# Patient Record
Sex: MALE | Race: OTHER | HISPANIC OR LATINO | URBAN - METROPOLITAN AREA
[De-identification: names, ages, dates, MRNs, and addresses within clinical notes are randomized per-mention and may not be internally consistent; named-entity substitution may affect disease eponyms.]

---

## 2021-12-14 ENCOUNTER — EMERGENCY (EMERGENCY)
Facility: HOSPITAL | Age: 41
LOS: 1 days | Discharge: HOME | End: 2021-12-14
Attending: STUDENT IN AN ORGANIZED HEALTH CARE EDUCATION/TRAINING PROGRAM
Payer: MEDICARE

## 2021-12-14 VITALS
DIASTOLIC BLOOD PRESSURE: 90 MMHG | OXYGEN SATURATION: 96 % | RESPIRATION RATE: 18 BRPM | HEART RATE: 110 BPM | SYSTOLIC BLOOD PRESSURE: 157 MMHG | TEMPERATURE: 98 F

## 2021-12-14 LAB
ALBUMIN SERPL ELPH-MCNC: 4.7 G/DL — SIGNIFICANT CHANGE UP (ref 3.3–5.2)
ALP SERPL-CCNC: 135 U/L — HIGH (ref 40–120)
ALT FLD-CCNC: 103 U/L — HIGH
ANION GAP SERPL CALC-SCNC: 19 MMOL/L — HIGH (ref 5–17)
ANISOCYTOSIS BLD QL: SLIGHT — SIGNIFICANT CHANGE UP
APTT BLD: 30.2 SEC — SIGNIFICANT CHANGE UP (ref 27.5–35.5)
AST SERPL-CCNC: 156 U/L — HIGH
BASOPHILS # BLD AUTO: 0 K/UL — SIGNIFICANT CHANGE UP (ref 0–0.2)
BASOPHILS NFR BLD AUTO: 0 % — SIGNIFICANT CHANGE UP (ref 0–2)
BILIRUB SERPL-MCNC: 0.5 MG/DL — SIGNIFICANT CHANGE UP (ref 0.4–2)
BUN SERPL-MCNC: 11 MG/DL — SIGNIFICANT CHANGE UP (ref 8–20)
CALCIUM SERPL-MCNC: 8.9 MG/DL — SIGNIFICANT CHANGE UP (ref 8.6–10.2)
CHLORIDE SERPL-SCNC: 102 MMOL/L — SIGNIFICANT CHANGE UP (ref 98–107)
CO2 SERPL-SCNC: 20 MMOL/L — LOW (ref 22–29)
CREAT SERPL-MCNC: 0.63 MG/DL — SIGNIFICANT CHANGE UP (ref 0.5–1.3)
EOSINOPHIL # BLD AUTO: 0.79 K/UL — HIGH (ref 0–0.5)
EOSINOPHIL NFR BLD AUTO: 4.8 % — SIGNIFICANT CHANGE UP (ref 0–6)
ETHANOL SERPL-MCNC: 357 MG/DL — HIGH (ref 0–9)
GLUCOSE SERPL-MCNC: 198 MG/DL — HIGH (ref 70–99)
HCT VFR BLD CALC: 48.9 % — SIGNIFICANT CHANGE UP (ref 39–50)
HGB BLD-MCNC: 16.3 G/DL — SIGNIFICANT CHANGE UP (ref 13–17)
INR BLD: 1.02 RATIO — SIGNIFICANT CHANGE UP (ref 0.88–1.16)
LYMPHOCYTES # BLD AUTO: 30.5 % — SIGNIFICANT CHANGE UP (ref 13–44)
LYMPHOCYTES # BLD AUTO: 5.05 K/UL — HIGH (ref 1–3.3)
MACROCYTES BLD QL: SLIGHT — SIGNIFICANT CHANGE UP
MANUAL SMEAR VERIFICATION: SIGNIFICANT CHANGE UP
MCHC RBC-ENTMCNC: 28.5 PG — SIGNIFICANT CHANGE UP (ref 27–34)
MCHC RBC-ENTMCNC: 33.3 GM/DL — SIGNIFICANT CHANGE UP (ref 32–36)
MCV RBC AUTO: 85.5 FL — SIGNIFICANT CHANGE UP (ref 80–100)
MONOCYTES # BLD AUTO: 0.63 K/UL — SIGNIFICANT CHANGE UP (ref 0–0.9)
MONOCYTES NFR BLD AUTO: 3.8 % — SIGNIFICANT CHANGE UP (ref 2–14)
MYELOCYTES NFR BLD: 0.9 % — HIGH (ref 0–0)
NEUTROPHILS # BLD AUTO: 8.51 K/UL — HIGH (ref 1.8–7.4)
NEUTROPHILS NFR BLD AUTO: 51.4 % — SIGNIFICANT CHANGE UP (ref 43–77)
OVALOCYTES BLD QL SMEAR: SLIGHT — SIGNIFICANT CHANGE UP
PLAT MORPH BLD: NORMAL — SIGNIFICANT CHANGE UP
PLATELET # BLD AUTO: 206 K/UL — SIGNIFICANT CHANGE UP (ref 150–400)
POIKILOCYTOSIS BLD QL AUTO: SLIGHT — SIGNIFICANT CHANGE UP
POLYCHROMASIA BLD QL SMEAR: SLIGHT — SIGNIFICANT CHANGE UP
POTASSIUM SERPL-MCNC: 3.8 MMOL/L — SIGNIFICANT CHANGE UP (ref 3.5–5.3)
POTASSIUM SERPL-SCNC: 3.8 MMOL/L — SIGNIFICANT CHANGE UP (ref 3.5–5.3)
PROT SERPL-MCNC: 8.8 G/DL — HIGH (ref 6.6–8.7)
PROTHROM AB SERPL-ACNC: 11.8 SEC — SIGNIFICANT CHANGE UP (ref 10.6–13.6)
RAPID RVP RESULT: DETECTED
RBC # BLD: 5.72 M/UL — SIGNIFICANT CHANGE UP (ref 4.2–5.8)
RBC # FLD: 15.6 % — HIGH (ref 10.3–14.5)
RBC BLD AUTO: ABNORMAL
RV+EV RNA SPEC QL NAA+PROBE: DETECTED
SARS-COV-2 RNA SPEC QL NAA+PROBE: SIGNIFICANT CHANGE UP
SMUDGE CELLS # BLD: PRESENT — SIGNIFICANT CHANGE UP
SODIUM SERPL-SCNC: 141 MMOL/L — SIGNIFICANT CHANGE UP (ref 135–145)
TROPONIN T SERPL-MCNC: <0.01 NG/ML — SIGNIFICANT CHANGE UP (ref 0–0.06)
VARIANT LYMPHS # BLD: 8.6 % — HIGH (ref 0–6)
WBC # BLD: 16.55 K/UL — HIGH (ref 3.8–10.5)
WBC # FLD AUTO: 16.55 K/UL — HIGH (ref 3.8–10.5)

## 2021-12-14 PROCEDURE — 99285 EMERGENCY DEPT VISIT HI MDM: CPT

## 2021-12-14 PROCEDURE — 93010 ELECTROCARDIOGRAM REPORT: CPT

## 2021-12-14 PROCEDURE — 70450 CT HEAD/BRAIN W/O DYE: CPT | Mod: 26,MA

## 2021-12-14 RX ORDER — MIDAZOLAM HYDROCHLORIDE 1 MG/ML
2 INJECTION, SOLUTION INTRAMUSCULAR; INTRAVENOUS ONCE
Refills: 0 | Status: DISCONTINUED | OUTPATIENT
Start: 2021-12-14 | End: 2021-12-14

## 2021-12-14 RX ADMIN — MIDAZOLAM HYDROCHLORIDE 2 MILLIGRAM(S): 1 INJECTION, SOLUTION INTRAMUSCULAR; INTRAVENOUS at 14:39

## 2021-12-14 NOTE — ED PROVIDER NOTE - NSFOLLOWUPINSTRUCTIONS_ED_ALL_ED_FT
Intoxicación de alcohol    LO QUE NECESITA SABER:    ¿Qué es la intoxicación con alcohol?La intoxicación por alcohol es annemarie condición física dañina causada cuando usted yana más alcohol de lo que ramírez cuerpo puede manejar. También se llama envenenamiento por etanol o estado ebrio.    ¿Qué necesito saber acerca de los límites recomendados de alcohol?  •Los hombres de 21 a 64 añosdeberían limitar el consumo de alcohol a 2 tragos al día. No tome más de 4 bebidas por día o más de 14 en annemarie semana.      •Todos los hombres y las mujeres de 65 años o másdeberían limitar el consumo de alcohol a 1 trago por día. No tome más de 3 bebidas por día o más de 7 en annemarie semana. Ninguna cantidad de alcohol se considera adecuada soy el embarazo.      ¿Qué son los signos y síntomas comunes de la intoxicación con alcohol?  •Aliento que huele analy alcohol      •Pérdida de la conciencia o convulsiones      •Pupilas dilatadas o movimientos oculares más rápidos de lo que normalmente son para usted      •Latidos cardíacos rápidos y respiración lenta      •Pérdida del equilibrio o incapacidad de caminar derecho o permanecer parado      •Náuseas y vómitos      •Habla incoherente o fuertemente      •Cambios de humor rápidos      •Problemas en el trabajo o en la escuela, o comportamiento riesgoso, aanly tener relaciones sexuales sin protección o conducir en estado de embriaguez      ¿Cómo se trata la intoxicación de alcohol?Ramírez médico le preguntará por ramírez consumo de alcohol. Las preguntas pueden incluir la cantidad, la frecuencia y el tipo de alcohol que usted yana. Ramírez médico puede evaluar ramírez memoria. Se pueden examinar muestras de andrea u orina para detectar alcohol y signos de daño hepático, renal o cardíaco. Es posible que deba seguir alguno de los siguientes tratamientos:   •Los medicamentospueden administrarse para ayudarlo a mantener la calma, controlar las convulsiones o prevenir las náuseas y los vómitos. También se le pueden administrar glucosa o vitamina B1 si carol ann niveles son demasiado bajos.      •En la terapia de intervención breve,un profesional de la edda lo ayuda a pensar de manera diferente sobre ramírez consumo de alcohol. Khushboo profesional también lo ayuda a fijar metas para disminuir ramírez consumo de bebidas alcohólicas. La terapia puede continuar después de que sale del hospital.      •El oxígeno adicionalsi está tan intoxicado que no puede respirar natalie por sí mismo.      ¿Dónde puedo obtener más información?  •Alcoholics Anonymous  Web Address: http://www.aa.org      •Substance Abuse and Mental Health Services Administration  [address]  [place] [address]  Web Address: http://www.samhsa.gov        Llame al número de emergencias local (911 en los [place]) si:  •Tiene dolor en el pecho o dificultad repentina para respirar.      •Usted sufre annemarie convulsión.      •Usted se siente suficientemente gabby analy para lastimarse o lastimar a otras personas.      ¿Cuándo christal llamar a mi médico?  •Usted tiene alucinaciones (ve o escucha cosas que no son reales).      •Usted no puede dejar de vomitar.      •Usted tiene preguntas o inquietudes acerca de ramírez condición o cuidado.      ACUERDOS SOBRE RAMÍREZ CUIDADO:    Usted tiene el derecho de ayudar a planear ramírez cuidado. Aprenda todo lo que pueda sobre ramírez condición y analy darle tratamiento. Discuta carol ann opciones de tratamiento con carol ann médicos para decidir el cuidado que usted desea recibir. Usted siempre tiene el derecho de rechazar el tratamiento.

## 2021-12-14 NOTE — ED PROVIDER NOTE - OBJECTIVE STATEMENT
38 y/o M pt with unknown pmhx presenting today with c/o AMS. Per ems, pt was found by the [employer]odega, surrounded by bottles of alcohol. Pt denies drinking today, noted to have L sided facial droop. Hx limited. Code stroke called, lkw unknown.

## 2021-12-14 NOTE — ED ADULT TRIAGE NOTE - CHIEF COMPLAINT QUOTE
Patient BIBA to ED today after being found sitting up on a crate outside of a deli with empty beer bottles and empty bottle Randell Vodka at his side.  Dr. Garcia at bedside to evaluate patient. Patient BIBA to ED today after being found sitting up on a crate outside of a deli with empty beer bottles and empty bottle Randell Vodka at his side.  Dr. Garcia at bedside to evaluate patient.  GCS 13. Patient BIBA to ED today after being found sitting up on a crate outside of a deli with empty beer bottles and empty bottle Randell Vodka at his side.  Dr. Garcia at bedside to evaluate patient.  GCS 13.  Code stroke called by MD Wharton.

## 2021-12-14 NOTE — ED ADULT NURSE REASSESSMENT NOTE - GENERAL PATIENT STATE
Received patient sleeping on stretcher, responses to tactile stimuli, breathing freely via room air with no sign of acute distress noted. AOB. Patient is in stable condition. Safety measures in progress. Pending sobriety. Will continue to monitor./resting/sleeping

## 2021-12-14 NOTE — CONSULT NOTE ADULT - SUBJECTIVE AND OBJECTIVE BOX
last seen well=   last seen well= Not known. He was found outside a liquor store.   last seen well= Not known. He was found outside a liquor store.  Code stroke was cancelled   per EDMD as suspicion for a stroke is low and patient was restless and would need to be sedated for CTA CTP.

## 2021-12-14 NOTE — ED PROVIDER NOTE - PHYSICAL EXAMINATION
Gen: no acute distress, appears grossly intoxicated, unkempt appearing  Head: normocephalic, atraumatic  Lung: CTAB, no respiratory distress, no wheezing, rales, rhonchi  CV: normal s1/s2, rrr,   Abd: soft, no tenderness, non-distended  MSK: No edema, no visible deformities, full range of motion in all 4 extremities  Neuro: 5/5 strength to all extremities, sensation intact, L sided facial droop, otherwise CN 2-12 intact   Skin: No rash   Psych: normal affect

## 2021-12-14 NOTE — ED PROVIDER NOTE - CLINICAL SUMMARY MEDICAL DECISION MAKING FREE TEXT BOX
pt presenting as code stroke with unknown lkw. Found by ems surrounded by empty bottles, but denies drinking today. Noted L facial droop. WIll order CTs, check labs, etoh, reassess.

## 2021-12-14 NOTE — ED PROVIDER NOTE - PATIENT PORTAL LINK FT
You can access the FollowMyHealth Patient Portal offered by [facility] by registering at the following website: [email or web address]. By joining [employer]’s FollowMyHealth portal, you will also be able to view your health information using other applications (apps) compatible with our system.

## 2021-12-14 NOTE — ED PROVIDER NOTE - PROGRESS NOTE DETAILS
As per sign-out from Dr. Loza patient pending sobriety. Patient was a code stroke however, slight facial droop has resolved and patient with resting comfortably. Patient is easily arousable and improved mental state.

## 2021-12-14 NOTE — ED PROVIDER NOTE - ATTENDING CONTRIBUTION TO CARE
Akiko: I performed a face to face evaluation of this patient and performed a full history and physical examination on the patient.  I agree with the resident's history, physical examination, and plan of the patient unless otherwise noted. My brief assessment is as follows: biba limited history, found outside [place], bystander called, pt altered with right facial droop. pt denies etoh, not speaking much, unclear onset of facial droop/ams. limited effort of lifting arms but can lift off bed when following commands. no other obvious deficits. suspect likely etoh abuse given story but without pmh/history from patient and with ams/some mild droop/foaming from mouth stroke activated. not tpa candidate given no known time of onset/baseline. check etoh, reassess.

## 2021-12-15 VITALS
DIASTOLIC BLOOD PRESSURE: 87 MMHG | OXYGEN SATURATION: 98 % | SYSTOLIC BLOOD PRESSURE: 150 MMHG | RESPIRATION RATE: 17 BRPM | HEART RATE: 99 BPM

## 2021-12-15 NOTE — ED ADULT NURSE NOTE - OBJECTIVE STATEMENT
Patient is alert and oriented x 3, breathing freely via room with no sign of acute distress noted. Patient is due for discharge. Patient is in stable condition, ambulates with steady gait.

## 2021-12-15 NOTE — ED ADULT NURSE NOTE - CHIEF COMPLAINT QUOTE
Patient BIBA to ED today after being found sitting up on a crate outside of a deli with empty beer bottles and empty bottle Randell Vodka at his side.  Dr. Garcia at bedside to evaluate patient.  GCS 13.  Code stroke called by MD Wharton.

## 2021-12-15 NOTE — ED ADULT NURSE NOTE - CAS EDN DISCHARGE ASSESSMENT
Patient left the unit in stable condition, to go to the waiting area to wait for ride in the morning. ambulates with steady gait./Alert and oriented to person, place and time/Awake/Symptoms improved 2

## 2022-06-05 ENCOUNTER — EMERGENCY (EMERGENCY)
Facility: HOSPITAL | Age: 42
LOS: 1 days | Discharge: HOME | End: 2022-06-05
Attending: EMERGENCY MEDICINE
Payer: SELF-PAY

## 2022-06-05 VITALS
WEIGHT: 199.96 LBS | OXYGEN SATURATION: 95 % | SYSTOLIC BLOOD PRESSURE: 129 MMHG | RESPIRATION RATE: 20 BRPM | HEIGHT: 67 IN | HEART RATE: 114 BPM | DIASTOLIC BLOOD PRESSURE: 77 MMHG | TEMPERATURE: 98 F

## 2022-06-05 PROCEDURE — 71045 X-RAY EXAM CHEST 1 VIEW: CPT | Mod: 26

## 2022-06-05 PROCEDURE — 90715 TDAP VACCINE 7 YRS/> IM: CPT

## 2022-06-05 PROCEDURE — 93010 ELECTROCARDIOGRAM REPORT: CPT

## 2022-06-05 PROCEDURE — 72125 CT NECK SPINE W/O DYE: CPT | Mod: 26,MA

## 2022-06-05 PROCEDURE — 70486 CT MAXILLOFACIAL W/O DYE: CPT | Mod: MA

## 2022-06-05 PROCEDURE — 93005 ELECTROCARDIOGRAM TRACING: CPT

## 2022-06-05 PROCEDURE — 70450 CT HEAD/BRAIN W/O DYE: CPT | Mod: 26,MA

## 2022-06-05 PROCEDURE — 96372 THER/PROPH/DIAG INJ SC/IM: CPT

## 2022-06-05 PROCEDURE — 70486 CT MAXILLOFACIAL W/O DYE: CPT | Mod: 26,MA

## 2022-06-05 PROCEDURE — 71045 X-RAY EXAM CHEST 1 VIEW: CPT

## 2022-06-05 PROCEDURE — 99285 EMERGENCY DEPT VISIT HI MDM: CPT

## 2022-06-05 PROCEDURE — 90471 IMMUNIZATION ADMIN: CPT

## 2022-06-05 PROCEDURE — 70450 CT HEAD/BRAIN W/O DYE: CPT | Mod: MA

## 2022-06-05 PROCEDURE — 72125 CT NECK SPINE W/O DYE: CPT | Mod: MA

## 2022-06-05 PROCEDURE — 99285 EMERGENCY DEPT VISIT HI MDM: CPT | Mod: 25

## 2022-06-05 PROCEDURE — 82962 GLUCOSE BLOOD TEST: CPT

## 2022-06-05 RX ORDER — MIDAZOLAM HYDROCHLORIDE 1 MG/ML
4 INJECTION, SOLUTION INTRAMUSCULAR; INTRAVENOUS ONCE
Refills: 0 | Status: DISCONTINUED | OUTPATIENT
Start: 2022-06-05 | End: 2022-06-05

## 2022-06-05 RX ORDER — SODIUM CHLORIDE 9 MG/ML
1000 INJECTION, SOLUTION INTRAVENOUS ONCE
Refills: 0 | Status: DISCONTINUED | OUTPATIENT
Start: 2022-06-05 | End: 2022-06-10

## 2022-06-05 RX ORDER — TETANUS TOXOID, REDUCED DIPHTHERIA TOXOID AND ACELLULAR PERTUSSIS VACCINE, ADSORBED 5; 2.5; 8; 8; 2.5 [IU]/.5ML; [IU]/.5ML; UG/.5ML; UG/.5ML; UG/.5ML
0.5 SUSPENSION INTRAMUSCULAR ONCE
Refills: 0 | Status: COMPLETED | OUTPATIENT
Start: 2022-06-05 | End: 2022-06-05

## 2022-06-05 RX ADMIN — Medication 50 MILLIGRAM(S): at 19:13

## 2022-06-05 RX ADMIN — MIDAZOLAM HYDROCHLORIDE 4 MILLIGRAM(S): 1 INJECTION, SOLUTION INTRAMUSCULAR; INTRAVENOUS at 15:52

## 2022-06-05 RX ADMIN — TETANUS TOXOID, REDUCED DIPHTHERIA TOXOID AND ACELLULAR PERTUSSIS VACCINE, ADSORBED 0.5 MILLILITER(S): 5; 2.5; 8; 8; 2.5 SUSPENSION INTRAMUSCULAR at 15:52

## 2022-06-05 NOTE — ED ADULT NURSE REASSESSMENT NOTE - NS ED NURSE REASSESS COMMENT FT1
Assumed care of patient in b15L. Pt a&ox4 rr even and unlabored Japanese speaking. pt reports etoh use, fell on face. Pt has no complains at this time denies chest pain, sob, fevers, chills. pt educated on plan of care, pt able to successfully teach back plan of care to RN, RN will continue to reeducate pt during hospital stay.

## 2022-06-05 NOTE — ED PROVIDER NOTE - PATIENT PORTAL LINK FT
You can access the FollowMyHealth Patient Portal offered by [facility] by registering at the following website: [email or web address]. By joining [employer]’s FollowMyHealth portal, you will also be able to view your health information using other applications (apps) compatible with our system.

## 2022-06-05 NOTE — ED ADULT TRIAGE NOTE - CHIEF COMPLAINT QUOTE
C/o face injury s/p fall. Pt found on street. Endorses alcohol consumption. +Abrasion to face. MD called to bedside for eval.

## 2022-06-05 NOTE — ED PROVIDER NOTE - OBJECTIVE STATEMENT
Patient is a 39 yo male with unknown PMHx BIBEMS after being found intoxicated on the street s/p fall. As per EMS patient was intoxicated, drinking alcohol when he fell forward and landed on his face, found down on the street by a deli owner who called EMS. Patient arrives VSS, alert and oriented to person and place no time, with slurred speech, clinically intoxicated currently; GCS 15, scattered abrasions to face with forehead hematoma, no active bleeding, no other signs of trauma. Denies fevers, chills, dizziness, lightheadedness, dysphagia, dysarthria, diplopia, photophobia, syncope, cough, congestion, SOB, CP, abdominal pain, neck pain, back pain, diarrhea, dysuria, hematuria, hematochezia, hematemesis, n/v, recent travel, sick contacts, leg swelling.

## 2022-06-05 NOTE — ED ADULT NURSE REASSESSMENT NOTE - NS ED NURSE REASSESS COMMENT FT1
pt. awake, asking for water, PO challenged and tolerated. pt. also ambulated with steady gait. pt. . MD Falcon made aware. EKG obtained pt. denies complaint at this time states " im sleepy" no active bleeding noted to face.

## 2022-06-05 NOTE — ED PROVIDER NOTE - ATTENDING CONTRIBUTION TO CARE
I, Jason Falcon, personally saw the patient with the resident, and completed the key components of the history and physical exam. I then discussed the management plan with the resident.    41 yo M found down next to a dumpster with abrasion and blood on the face. patient admits to drinking "a lot" today. no chest pain. no abdominal pain. moving all extremities. patient given versed 4 mg IM for sedation to CT scan. CT showed no traumatic injury. Tdap given. patient dropped saturating while sleeping, improved with re-positioning and O2. pending clinical sobriety prior to discharge.

## 2022-06-05 NOTE — ED PROVIDER NOTE - CLINICAL SUMMARY MEDICAL DECISION MAKING FREE TEXT BOX
Patient is a 39 yo male with unknown PMHx BIBEMS after being found intoxicated on the street s/p fall. As per EMS patient was intoxicated, drinking alcohol when he fell forward and landed on his face, found down on the street by a deli owner who called EMS. Patient arrives VSS, alert and oriented to person and place not time, with slurred speech, clinically intoxicated currently; GCS 15, scattered abrasions to face with forehead hematoma, no active bleeding, no other signs of trauma, chest wall nontender, belly soft nontender, CVA nontender, moving all extremities equally. CT head/c-spine/maxillofacial to r/o fractures vs. bleed. Tetanus shot given; versed for mild agitation. Protecting airway, currently stable, will continue to monitor.

## 2022-06-05 NOTE — ED PROVIDER NOTE - NSFOLLOWUPINSTRUCTIONS_ED_ALL_ED_FT
Paciente: BRAD YU  Profesional que asiste al paciente: Mike Rae  Consumo excesivo de alcohol y nutrición  Alcohol Abuse and Nutrition    El consumo excesivo de alcohol es cualquier patrón de consumo de alcohol que perjudique la edda, las relaciones o el trabajo. El consumo excesivo de alcohol puede causar nutrición deficiente (malnutrición o desnutrición) y annemarie falta de nutrientes (carencias nutritivas), lo que kassi vez ocasione otras complicaciones. El consumo excesivo de alcohol causa desnutrición y carencia nutritivas de dos formas:    Hace que el hígado funcione de forma anormal. Peak afecta la forma en la que el organismo divide (descompone) y absorbe los nutrientes de los alimentos.  Hace que la persona coma mal. Muchas personas que consumen alcohol en exceso no ingieren la cantidad suficiente de carbohidratos, proteínas, grasas, vitaminas y minerales.    Los nutrientes que suelen faltar (ser deficitarios) en las personas que consumen alcohol en exceso incluyen:    Vitaminas.    Vitamina A. Esta es importante para la visión, el metabolismo y la capacidad para combatir las infecciones (inmunidad).  Vitaminas B. Estas incluyen vitaminas tales analy el folato, la tiamina y la niacina. Estas son necesarias para el crecimiento de las nuevas células.  Vitamina C. Esta desempeña un papel importante en la cicatrización de las heridas, la inmunidad y ayuda al organismo a absorber el leonidas.  Vitamina D. Esta es necesaria para que el organismo absorba y use el calcio. Es producida por el hígado, lorena puede obtenerse también de alimentos y de la exposición al sol.  Minerales.    Calcio. Khushboo es necesario para tener huesos saludables, así analy annemarie función del corazón y los vasos sanguíneos (cardiovascular) saludable.  Leonidas. Es importante para la andrea, los músculos y el funcionamiento del sistema nervioso.  Magnesio. Desempeña un papel importante en el funcionamiento muscular y nervioso, y ayuda a controlar el nivel de azúcar en la andrea y la presión arterial.  Zinc. Khushboo es importante para el normal funcionamiento del sistema nervioso y el aparato digestivo (tubo digestivo).    Si doris que tiene un problema de dependencia del alcohol, o si le jackie dejar de beber porque se siente enfermo o diferente cuando no yana alcohol, hable con ramírez médico u otro profesional de la edda acerca de dónde obtener ayuda.    La nutrición es un factor fundamental del tratamiento para el consumo excesivo de alcohol. El médico o el especialista en dieta y nutrición (nutricionista) trabajarán con usted para diseñar un plan que pueda ayudar a que el organismo recupere los nutrientes y a evitar el riesgo de posibles complicaciones.    ¿En qué consiste el plan?     El nutricionista puede desarrollar un plan de alimentación específico en función de ramírez estado y de otros problemas que pueda tener. Un plan de alimentación incluirá por lo general:    Annemarie dieta equilibrada.    Cereales: de 6 a 8 onzas (170 a 227 g) por día. Algunos ejemplos de 1 onza de cereales integrales incluyen 1 taza de cereales de cornelius integral, ½ taza de arroz integral o 1 rodaja de pan de cornelius integral.  Verduras: de 2 a 3 tazas por día. Algunos ejemplos de 1 taza de verduras incluyen 2 zanahorias medianas, 1 tomate giselle o 2 tallos de apio.  Frutas: de 1 a 2 tazas por día. Algunos ejemplos de 1 taza de frutas incluyen 1 banana giselle, 1 manzana pequeña, 8 fresas grandes o 1 naranja giselle.  Carne y otras proteínas: de 5 a 6 onzas (142 a 170 g) por día.    Annemarie porción de carne o pescado del tamaño de un jaciel de cartas pesa alrededor de 3 a 4 onzas.  Los alimentos que proporcionan 1 onza de proteínas incluyen 1 huevo, ½ taza de jadyn secos o semillas, o 1 cucharada (16 g) de mantequilla de maní.  Lácteos: de 2 a 3 tazas por día. Algunos ejemplos de 1 taza de lácteos son 8 onzas (230 ml) de leche, 8 onzas (230 g) de yogur o 1½ onzas (44 g) de queso natural.  Suplementos de vitaminas y minerales.    ¿Cuáles son algunos consejos para seguir khushboo plan?  Ingiera comidas y refrigerios con frecuencia. Intente hacer 5 o 6 comidas pequeñas por día.  [place] los suplementos vitamínicos y minerales analy se lo haya indicado el nutricionista.  Si está desnutrido o si se lo recomienda el nutricionista:    Puede seguir annemarie dieta ryann en proteínas y calorías. Puede incluir:    2000 a 3000 calorías (kilocalorías) al día.  70 a 100 g (gramos) de proteínas al día.  Se le puede indicar que siga annemarie dieta que incluya annemarie bebida nutricional completa. Khushboo tipo de suplemento nutricional puede ayudar a que el organismo recupere las calorías, las proteínas y las vitaminas. En función de ramírez estado, kassi vez le recomienden que consuma esta bebida en lugar de comer o que la agregue a las comidas.  Ciertos medicamentos pueden causar cambios en el apetito, el sentido del gusto y el peso. Trabaje con el médico y el nutricionista para hacer cambios en carol ann medicamentos y en el plan de alimentación.  Si no puede ingerir la cantidad suficiente de comida y calorías por boca, el médico puede recomendar que le coloquen annemarie sonda de alimentación. Esta sonda proporciona suplementos nutricionales directamente al estómago.    Alimentos recomendados  Coma alimentos que ashlyn ricos en moléculas que eviten que el oxígeno reaccione con la comida (antioxidantes). Estos alimentos incluyen uvas, jadyn rojos, jadyn secos, té paresh, y verduras paresh oscuro o anaranjadas. Peak puede ayudar a prevenir parte de la agresión que sufre el hígado cuando se consume alcohol.  Coma diariamente annemarie variedad de frutas y verduras frescas. Peak ayudará a aportar fibras y vitaminas a ramírez dieta.  Ailyn annemarie gran cantidad de agua y otros líquidos transparentes, analy jugo de manzana y caldo. Intente beber por lo menos de 48 a 64 oz (de 1.5 a 2 l) de agua por día.  Incluya en ramírez dieta alimentos fortificados con vitaminas y minerales. Los alimentos que suelen estar fortificados son, entre otros, la leche, el jugo de naranja, los cereales y el pan.  Coma alimentos variados con alto contenido de ácidos grasos omega 3 y omega 6. Estos incluyen, pescado, jadyn secos y semillas, y porotos de soja. Estos alimentos pueden ayudar a que el hígado se recupere y, además, a estabilizar el estado de ánimo.  Si es vegetariano:    Coma alimentos variados ricos en proteínas.  A la hora de las comidas y las colaciones, combine cereales integrales con proteínas vegetales. Por ejemplo, coma arroz con frijoles, unte annemarie tostada integral con mantequilla de maní o ingiera meng con semillas de girasol.    Es posible que los productos que se enumeran más arriba no constituyan annemarie lista completa de los alimentos y las bebidas que puede alondra. Consulte a un nutricionista para obtener más información.    Alimentos que deben evitarse  Evite los alimentos y las bebidas con alto contenido de grasa y azúcar. Las bebidas con mucha azúcar, los refrigerios salados y los dulces contienen calorías vacías. Peak significa que carecen de nutrientes importantes, analy las proteínas, las fibras y las vitaminas.  Evite alondra alcohol. Esta es la mejor forma de evitar la desnutrición debido al consumo excesivo de alcohol. Si debe beber, ailyn cantidades moderadas. Beber en forma moderada significa limitarse a no más de 1 medida por día si es mishel y no está embarazada, y de 2 medidas por día si es hombre. Annemarie medida equivale a 12 oz (355 ml) de cerveza, 5 oz (148 ml) de vino o 1½ oz (44 ml) de bebidas alcohólicas de ryann graduación.  Limite el consumo de cafeína. Reemplace las bebidas analy el café y té indy con café descafeinado y té de hierbas descafeinado.    Es posible que los productos que se enumeran más arriba no constituyan annemarie lista completa de los alimentos y las bebidas que debe evitar. Consulte a un nutricionista para obtener más información.    Resumen  El consumo excesivo de alcohol puede causar nutrición deficiente (malnutrición o desnutrición) y annemarie falta de nutrientes (carencias nutritivas), lo que kassi vez ocasione otros problemas de edda.  Las carencias nutritivas frecuentes incluyen las carencias de vitaminas (A, B, C y D) y de minerales (calcio, leonidas, magnesio y zinc).  La nutrición es un factor fundamental del tratamiento para el consumo excesivo de alcohol.  EL médico y el nutricionista pueden ayudarlo a desarrollar un plan de alimentación específico que incluya annemarie dieta equilibrada más suplementos vitamínicos y minerales.    NOTAS ADICIONALES E INSTRUCCIONES    Please follow up with your Primary MD in 24-48 hr.  Seek immediate medical care for any new/worsening signs or symptoms.     Document Released: 10/12/2006 Document Revised: 4/7/2020 Document Reviewed: 9/4/2018  Elsevier Interactive Patient Education ©2019 [employer]vier Inc. This information is not intended to replace advice given to you by your health care provider. Make sure you discuss any questions you have with your health care provider.

## 2022-06-05 NOTE — ED PROVIDER NOTE - PHYSICAL EXAMINATION
Constitutional: Uncomfortable appearing, awake, alert, oriented to person, place, not time/situation and in no apparent distress  ENMT: Airway patent nasal mucosa clear. Mouth with normal mucosa. Throat has no vesicles no oropharyngeal exudates and uvula is midline. No blood in the oropharynx  EYES: clear bilaterally, pupils equal, round and reactive to light  Cardiac: Tachycardic, regular rhythm. Heart sounds S1, S2. No murmurs, rubs or gallops. Good capillary refill, 2+ pulses, no peripheral edema  Respiratory: Lungs CTAB, no use of accessory muscles, no crackles, satting 96% on RA in no distress  Gastrointestinal: Abdomen nondistended, non-tender, no rebound guarding or peritoneal signs  Genitourinary: No CVA tenderness, pelvis nontender, bladder nondistended, no flank ecchymoses   Musculoskeletal: Spine appears normal, range of motion is not limited, no muscle or joint tenderness  Neurological: Alert, no focal deficits, no motor or sensory deficits. CN 2-12 intact, PERRLA, EOMI, No FND, moving all extremities equally, sensation intact, No dysmetria, negative heel-shin, 5/5 strength   Skin: scattered abrasions across face, forehead hematoma appreciated, no active bleeding

## 2022-06-06 VITALS
SYSTOLIC BLOOD PRESSURE: 132 MMHG | OXYGEN SATURATION: 98 % | HEART RATE: 90 BPM | RESPIRATION RATE: 18 BRPM | DIASTOLIC BLOOD PRESSURE: 80 MMHG

## 2022-08-10 ENCOUNTER — APPOINTMENT (OUTPATIENT)
Dept: FAMILY MEDICINE | Facility: HOSPITAL | Age: 42
End: 2022-08-10